# Patient Record
Sex: FEMALE | Race: WHITE | URBAN - METROPOLITAN AREA
[De-identification: names, ages, dates, MRNs, and addresses within clinical notes are randomized per-mention and may not be internally consistent; named-entity substitution may affect disease eponyms.]

---

## 2017-09-01 ENCOUNTER — TELEPHONE (OUTPATIENT)
Dept: OBGYN | Facility: CLINIC | Age: 27
End: 2017-09-01

## 2017-09-01 NOTE — TELEPHONE ENCOUNTER
Pt calling stating that she needs to schedule a colposcopy with our office. Pt is not a [facility] pt. She sees a [facility] doctor an hour outside the [facility]. The pt is going to have that office fax over the Pap/HPV results to us and then we will be able to call the pt and get her scheduled. Pt informed that she will need to check with her insurance to make sure that it will be covered. Thais Penny RN

## 2017-10-06 ENCOUNTER — OFFICE VISIT (OUTPATIENT)
Dept: OBGYN | Facility: CLINIC | Age: 27
End: 2017-10-06
Payer: COMMERCIAL

## 2017-10-06 VITALS
TEMPERATURE: 98.5 F | WEIGHT: 168 LBS | SYSTOLIC BLOOD PRESSURE: 143 MMHG | DIASTOLIC BLOOD PRESSURE: 85 MMHG | HEART RATE: 78 BPM

## 2017-10-06 DIAGNOSIS — R87.612 LOW GRADE SQUAMOUS INTRAEPITHELIAL LESION ON CYTOLOGIC SMEAR OF CERVIX (LGSIL): Primary | ICD-10-CM

## 2017-10-06 DIAGNOSIS — Z13.9 SCREENING PROCEDURE: ICD-10-CM

## 2017-10-06 LAB — BETA HCG QUAL IFA URINE: NEGATIVE

## 2017-10-06 PROCEDURE — 57454 BX/CURETT OF CERVIX W/SCOPE: CPT | Performed by: OBSTETRICS & GYNECOLOGY

## 2017-10-06 PROCEDURE — 88305 TISSUE EXAM BY PATHOLOGIST: CPT | Performed by: OBSTETRICS & GYNECOLOGY

## 2017-10-06 PROCEDURE — 84703 CHORIONIC GONADOTROPIN ASSAY: CPT | Performed by: OBSTETRICS & GYNECOLOGY

## 2017-10-06 NOTE — PROGRESS NOTES
Vidhya Mariee is a 27 year old female who presents for initial colposcopy, referred by Dr. Barbara Parker of [facility]. Pap smear 2 months ago showed: LSIL, atypical endocervical cells, as well as HPV 18 and other HR HPV positive. The prior pap showed ASC-US with HR HPV pos.     No past medical history on file.  No family history on file.    Previous history of abnormal paps?: Yes, as above  History of cryotherapy (freezing)?: : No  History of veneral diseases: : No  Do you desire testing for any of these diseases? : No  History of genital warts:  No  Visible warts now?:  No  Previously treated? If so, how?:  No     No LMP recorded.  On seasonique OCP  Type of contraception: oral contraceptive  Age at first sexual intercourse: 15  Number of sexual partners (lifetime): more than 6  History   Smoking Status     Never Smoker   Smokeless Tobacco     Never Used     History of sexual abuse:  No  Allergies as of 10/06/2017     (No Known Allergies)        PROCEDURE:  Before the procedure, it was ensured that the patient was educated regarding the nature of her findings to date, the implications of them, and what was to be done. She has been made aware of the role of HPV, the natural history of infection, ways to minimize her future risk, the effect of HPV on the cervix, and treatment options available should they be indicated. The   pathophysiology of the cervix, including a discussion of squamous vs. endometrial cells, and squamous metaplasia have all been reviewed, using illustrations and sketches. The details of the colposcopic procedure were reviewed, as well as the risks of missed diagnoses, pain, infection and bleeding. All questions were answered before proceeding, and informed consent was therefore obtained.    Bimanual examination: was not done  Unenhanced examination of the cervix was normal without lesions.    Please refer to images section for details!  Pap repeated?:  No  SCJ seen?:  yes  Endocervical  speculum needed?:  No  ECC done?:  Yes  Lugol's solution used?:  Yes  Satisfactory examination?:  yes    Vaginal vault: normal to cursory inspection   Urethra normal?:  yes  Labia normal?:  yes  Perineum normal?:  yes  Rectum normal?:  yes    FINDINGS:  Please see image   Cervix: acetowhitening at 7:00, acetowhitening and mosaicism noted at 2:00  Procedure: biopsies taken (not including ECC): 2.    Procedure summary: Patient tolerated procedure well     Assessment: RASHAUN 1    Plan: Specimens labelled and sent to pathology., Will base further treatment on pathology findings. and post biopsy instructions given to patient    Kathi Chowdhury MD

## 2017-10-06 NOTE — NURSING NOTE
Chief Complaint   Patient presents with     Colposcopy       Initial /85  Pulse 78  Temp 98.5  F (36.9  C) (Oral)  Wt 168 lb (76.2 kg)  LMP  There is no height or weight on file to calculate BMI.  BP completed using cuff size: regular    No obstetric history on file.    The following HM Due: NONE      The following patient reported/Care Every where data was sent to:  P ABSTRACT QUALITY INITIATIVES [71900]  CHE Velázquez

## 2017-10-06 NOTE — MR AVS SNAPSHOT
"              After Visit Summary   10/6/2017    Vidhya Mariee    MRN: [id number]           Patient Information     Date Of Birth          1990        Visit Information        Provider Department      10/6/2017 3:00 PM Kathi Chowdhury MD; RD [facility]        Today's Diagnoses     Low grade squamous intraepithelial lesion on cytologic smear of cervix (LGSIL)    -  1    Screening procedure           Follow-ups after your visit        Who to contact     If you have questions or need follow up information about today's clinic visit or your schedule please contact [facility] directly at [phone].  Normal or non-critical lab and imaging results will be communicated to you by MyChart, letter or phone within 4 business days after the clinic has received the results. If you do not hear from us within 7 days, please contact the clinic through [employer] or phone. If you have a critical or abnormal lab result, we will notify you by phone as soon as possible.  Submit refill requests through [employer] or call your pharmacy and they will forward the refill request to us. Please allow 3 business days for your refill to be completed.          Additional Information About Your Visit        MyChart Information     [employer] lets you send messages to your doctor, view your test results, renew your prescriptions, schedule appointments and more. To sign up, go to [email or web address] . Click on \"Log in\" on the left side of the screen, which will take you to the Welcome page. Then click on \"Sign up Now\" on the right side of the page.     You will be asked to enter the access code listed below, as well as some personal information. Please follow the directions to create your username and password.     Your access code is: [email or web address]  Expires: 2018  3:58 PM     Your access code will  in 90 days. If you need help or a new code, please call your [facility] or [phone].   "      Care EveryWhere ID     This is your Care EveryWhere ID. This could be used by other organizations to access your [facility] medical records  [id number]        Your Vitals Were     Pulse Temperature                78 98.5  F (36.9  C) (Oral)           Blood Pressure from Last 3 Encounters:   10/06/17 143/85    Weight from Last 3 Encounters:   10/06/17 168 lb (76.2 kg)              We Performed the Following     Beta HCG qual IFA urine - [facility] and [place]     COLP CERVIX/UPPER VAGINA     Surgical pathology exam        Primary Care Provider Office Phone # Fax #    Barbara Parker [phone]       [facility] [address]   [facility] [address]        Equal Access to Services     SASCHA REYNA : Hadii aad ku hadasho Soomaali, waaxda luqadaha, qaybta kaalmada adeegyada, waxay idiin haytaquerian donny delong. [facility] [phone].    ATENCIÓN: Si habla español, tiene a orosco disposición servicios gratuitos de asistencia lingüística. [facility] [phone].    We comply with applicable federal civil rights laws and [place] laws. We do not discriminate on the basis of race, color, national origin, age, disability, sex, sexual orientation, or gender identity.            Thank you!     Thank you for choosing [facility]  for your care. Our goal is always to provide you with excellent care. Hearing back from our patients is one way we can continue to improve our services. Please take a few minutes to complete the written survey that you may receive in the mail after your visit with us. Thank you!             Your Updated Medication List - Protect others around you: Learn how to safely use, store and throw away your medicines at www.disposemymeds.org.          This list is accurate as of: 10/6/17  3:58 PM.  Always use your most recent med list.                   Brand Name Dispense Instructions for use Diagnosis    acyclovir 400 MG tablet    ZOVIRAX     Take 400 mg by mouth  2 times daily.        ORTHO TRI-CYCLEN (28) 0.18/0.215/0.25 MG-35 MCG per tablet   Generic drug:  norgestim-eth estrad triphasic      Take  by mouth daily.        triamcinolone 0.1 % cream    KENALOG     Apply 0.5 inches topically 2 times daily.

## 2017-10-10 LAB — COPATH REPORT: NORMAL

## 2017-10-18 ENCOUNTER — TELEPHONE (OUTPATIENT)
Dept: OBGYN | Facility: CLINIC | Age: 27
End: 2017-10-18

## 2017-10-18 NOTE — TELEPHONE ENCOUNTER
Patient called to get colposcopy results from 10/6.  Please review and advise plan so we can call patient with results. Thanks.   Pretty Lagos RN-BSN

## 2017-10-19 NOTE — TELEPHONE ENCOUNTER
Colposcopy showed RASHAUN.  Based on her age, ASCCP guidelines recommend repeat co-testing in 12 months.    Thanks,  Kathi

## 2017-10-20 NOTE — TELEPHONE ENCOUNTER
TC to patient. Discussed results and recommendation. Pt stated understanding.   Pretty Lagos, RN-BSN

## 2018-05-11 ENCOUNTER — TRANSFERRED RECORDS (OUTPATIENT)
Dept: HEALTH INFORMATION MANAGEMENT | Facility: CLINIC | Age: 28
End: 2018-05-11

## 2018-05-11 LAB
CHOLEST SERPL-MCNC: 172 MG/DL
GLUCOSE SERPL-MCNC: 85 MG/DL (ref 70–100)
HDLC SERPL-MCNC: 68 MG/DL
LDLC SERPL CALC-MCNC: 89 MG/DL
PAP-ABSTRACT: ABNORMAL
TRIGL SERPL-MCNC: 76 MG/DL

## 2018-05-22 ENCOUNTER — TRANSFERRED RECORDS (OUTPATIENT)
Dept: HEALTH INFORMATION MANAGEMENT | Facility: CLINIC | Age: 28
End: 2018-05-22